# Patient Record
Sex: MALE | Race: WHITE | Employment: FULL TIME | URBAN - METROPOLITAN AREA
[De-identification: names, ages, dates, MRNs, and addresses within clinical notes are randomized per-mention and may not be internally consistent; named-entity substitution may affect disease eponyms.]

---

## 2019-02-12 ENCOUNTER — OFFICE VISIT (OUTPATIENT)
Dept: FAMILY MEDICINE CLINIC | Facility: CLINIC | Age: 49
End: 2019-02-12
Payer: COMMERCIAL

## 2019-02-12 VITALS
BODY MASS INDEX: 27.7 KG/M2 | HEIGHT: 69 IN | DIASTOLIC BLOOD PRESSURE: 80 MMHG | WEIGHT: 187 LBS | TEMPERATURE: 98 F | SYSTOLIC BLOOD PRESSURE: 132 MMHG | HEART RATE: 76 BPM

## 2019-02-12 DIAGNOSIS — R20.2 FACIAL PARESTHESIA: ICD-10-CM

## 2019-02-12 DIAGNOSIS — H92.02 LEFT EAR PAIN: Primary | ICD-10-CM

## 2019-02-12 PROCEDURE — 99203 OFFICE O/P NEW LOW 30 MIN: CPT | Performed by: FAMILY MEDICINE

## 2019-02-12 RX ORDER — VALACYCLOVIR HYDROCHLORIDE 1 G/1
1 TABLET, FILM COATED ORAL 3 TIMES DAILY
Qty: 21 TABLET | Refills: 0 | Status: SHIPPED | OUTPATIENT
Start: 2019-02-12 | End: 2019-02-19

## 2019-02-12 RX ORDER — ASPIRIN 325 MG
325 TABLET ORAL 3 TIMES DAILY PRN
COMMUNITY

## 2019-02-12 NOTE — PATIENT INSTRUCTIONS
--rest, fluids, soups, humidifier, tea with lemon or honey, tylenol/advil as needed for discomfort   --ibuprofen 600mg-800mg 3x/day with food consistently for 2-3 days, then only as needed for pain (do not use for prolonged period)    -- start valacyclov

## 2019-02-12 NOTE — PROGRESS NOTES
CC:  Jolene Ayala is a 51year old male here for Patient presents with:  Ear Pain: Left earache pain x 1 week  Sore Throat: Patient denies fever, bodyache and chills. Sore throat started yesterday.       HPI:     URI  -started 3-4 days ago  -associated with given length of symptoms, exam unremarkable  - encouraged supportive care (rest, fluids, honey, humidifier, tylenol/ibuprofen prn)  - work/school note given  - given paresthesias in face, some concern for early shingles vs bells palsy vs other (parotid sto

## 2019-02-20 ENCOUNTER — OFFICE VISIT (OUTPATIENT)
Dept: FAMILY MEDICINE CLINIC | Facility: CLINIC | Age: 49
End: 2019-02-20
Payer: COMMERCIAL

## 2019-02-20 VITALS
SYSTOLIC BLOOD PRESSURE: 110 MMHG | DIASTOLIC BLOOD PRESSURE: 70 MMHG | BODY MASS INDEX: 27.86 KG/M2 | HEART RATE: 84 BPM | WEIGHT: 188.13 LBS | HEIGHT: 69 IN | TEMPERATURE: 98 F

## 2019-02-20 DIAGNOSIS — R20.2 FACIAL PARESTHESIA: Primary | ICD-10-CM

## 2019-02-20 DIAGNOSIS — Z00.00 ROUTINE GENERAL MEDICAL EXAMINATION AT A HEALTH CARE FACILITY: ICD-10-CM

## 2019-02-20 DIAGNOSIS — R44.8 PARESTHESIA OF TONGUE: ICD-10-CM

## 2019-02-20 PROCEDURE — 99214 OFFICE O/P EST MOD 30 MIN: CPT | Performed by: FAMILY MEDICINE

## 2019-02-20 NOTE — PATIENT INSTRUCTIONS
-- come in for fasting bloodwork anytime that you are able to (8-10h no food, only water; lab here is open M-F, 8am-12)  -- we will call with results about 5-7 days after bloodwork is completed    -- I will touch base with neurologist and let you know if

## 2019-02-21 ENCOUNTER — LAB ENCOUNTER (OUTPATIENT)
Dept: LAB | Age: 49
End: 2019-02-21
Attending: FAMILY MEDICINE
Payer: COMMERCIAL

## 2019-02-21 DIAGNOSIS — Z00.00 ROUTINE GENERAL MEDICAL EXAMINATION AT A HEALTH CARE FACILITY: ICD-10-CM

## 2019-02-21 DIAGNOSIS — R20.2 FACIAL PARESTHESIA: ICD-10-CM

## 2019-02-21 LAB
ALBUMIN SERPL-MCNC: 3.9 G/DL (ref 3.4–5)
ALBUMIN/GLOB SERPL: 1.2 {RATIO} (ref 1–2)
ALP LIVER SERPL-CCNC: 65 U/L (ref 45–117)
ALT SERPL-CCNC: 30 U/L (ref 16–61)
ANION GAP SERPL CALC-SCNC: 7 MMOL/L (ref 0–18)
AST SERPL-CCNC: 18 U/L (ref 15–37)
BASOPHILS # BLD AUTO: 0.05 X10(3) UL (ref 0–0.2)
BASOPHILS NFR BLD AUTO: 0.7 %
BILIRUB SERPL-MCNC: 0.8 MG/DL (ref 0.1–2)
BUN BLD-MCNC: 16 MG/DL (ref 7–18)
BUN/CREAT SERPL: 13.1 (ref 10–20)
CALCIUM BLD-MCNC: 8.7 MG/DL (ref 8.5–10.1)
CHLORIDE SERPL-SCNC: 106 MMOL/L (ref 98–107)
CHOLEST SMN-MCNC: 202 MG/DL (ref ?–200)
CO2 SERPL-SCNC: 27 MMOL/L (ref 21–32)
CREAT BLD-MCNC: 1.22 MG/DL (ref 0.7–1.3)
DEPRECATED RDW RBC AUTO: 40.9 FL (ref 35.1–46.3)
EOSINOPHIL # BLD AUTO: 0.28 X10(3) UL (ref 0–0.7)
EOSINOPHIL NFR BLD AUTO: 3.8 %
ERYTHROCYTE [DISTWIDTH] IN BLOOD BY AUTOMATED COUNT: 13.8 % (ref 11–15)
FOLATE SERPL-MCNC: 26 NG/ML (ref 8.7–?)
GLOBULIN PLAS-MCNC: 3.3 G/DL (ref 2.8–4.4)
GLUCOSE BLD-MCNC: 90 MG/DL (ref 70–99)
HCT VFR BLD AUTO: 48.4 % (ref 39–53)
HDLC SERPL-MCNC: 29 MG/DL (ref 40–59)
HGB BLD-MCNC: 15.2 G/DL (ref 13–17.5)
IMM GRANULOCYTES # BLD AUTO: 0.05 X10(3) UL (ref 0–1)
IMM GRANULOCYTES NFR BLD: 0.7 %
LDLC SERPL CALC-MCNC: 108 MG/DL (ref ?–100)
LYMPHOCYTES # BLD AUTO: 2.01 X10(3) UL (ref 1–4)
LYMPHOCYTES NFR BLD AUTO: 27.6 %
M PROTEIN MFR SERPL ELPH: 7.2 G/DL (ref 6.4–8.2)
MCH RBC QN AUTO: 25.9 PG (ref 26–34)
MCHC RBC AUTO-ENTMCNC: 31.4 G/DL (ref 31–37)
MCV RBC AUTO: 82.6 FL (ref 80–100)
MONOCYTES # BLD AUTO: 0.84 X10(3) UL (ref 0.1–1)
MONOCYTES NFR BLD AUTO: 11.5 %
NEUTROPHILS # BLD AUTO: 4.06 X10 (3) UL (ref 1.5–7.7)
NEUTROPHILS # BLD AUTO: 4.06 X10(3) UL (ref 1.5–7.7)
NEUTROPHILS NFR BLD AUTO: 55.7 %
NONHDLC SERPL-MCNC: 173 MG/DL (ref ?–130)
OSMOLALITY SERPL CALC.SUM OF ELEC: 291 MOSM/KG (ref 275–295)
PLATELET # BLD AUTO: 304 10(3)UL (ref 150–450)
POTASSIUM SERPL-SCNC: 3.9 MMOL/L (ref 3.5–5.1)
RBC # BLD AUTO: 5.86 X10(6)UL (ref 4.3–5.7)
SODIUM SERPL-SCNC: 140 MMOL/L (ref 136–145)
T PALLIDUM AB SER QL IA: NONREACTIVE
TRIGL SERPL-MCNC: 325 MG/DL (ref 30–149)
TSI SER-ACNC: 1.59 MIU/ML (ref 0.36–3.74)
VIT B12 SERPL-MCNC: 691 PG/ML (ref 193–986)
VLDLC SERPL CALC-MCNC: 65 MG/DL (ref 0–30)
WBC # BLD AUTO: 7.3 X10(3) UL (ref 4–11)

## 2019-02-21 PROCEDURE — 82746 ASSAY OF FOLIC ACID SERUM: CPT | Performed by: FAMILY MEDICINE

## 2019-02-21 PROCEDURE — 86780 TREPONEMA PALLIDUM: CPT | Performed by: FAMILY MEDICINE

## 2019-02-21 PROCEDURE — 80061 LIPID PANEL: CPT | Performed by: FAMILY MEDICINE

## 2019-02-21 PROCEDURE — 80050 GENERAL HEALTH PANEL: CPT | Performed by: FAMILY MEDICINE

## 2019-02-21 PROCEDURE — 36415 COLL VENOUS BLD VENIPUNCTURE: CPT | Performed by: FAMILY MEDICINE

## 2019-02-21 PROCEDURE — 82607 VITAMIN B-12: CPT | Performed by: FAMILY MEDICINE

## 2019-02-21 PROCEDURE — 87476 LYME DIS DNA AMP PROBE: CPT | Performed by: FAMILY MEDICINE

## 2019-02-21 NOTE — PROGRESS NOTES
Pacheco Collado is a 51year old male here for Patient presents with: Follow - Up: Today here for facial paresthesia and Left ear pain follow up      HPI:       1.  Facial paresthesia  -ear pain has resolved  -feels like he still has the mild pain/discomfort/ x 3  HEENT: NCAT, pupils equal and round  Pulm: CTAB, no wheezing  CV: RRR, no murmurs  Abd: soft, ND, NT, +BS  Ext: full ROM  Psych: normal affect  Neuro: CN II-XII intact except for pain to light touch in left side of face, b/l UE and LE strength and sen

## 2019-02-27 LAB — BORRELIA SPECIES DNA DETECTION: NOT DETECTED

## 2019-03-04 ENCOUNTER — TELEPHONE (OUTPATIENT)
Dept: FAMILY MEDICINE CLINIC | Facility: CLINIC | Age: 49
End: 2019-03-04

## 2019-03-04 NOTE — TELEPHONE ENCOUNTER
----- Message from Teja Currie MD sent at 3/3/2019 10:37 PM CST -----  cholesterol mildly elevated, increase exercise, reduce fats and sugars, more fruits and veggies   Rest of labs normal, including lyme    Make sure he schedules MRI for further evalu

## 2019-03-04 NOTE — TELEPHONE ENCOUNTER
Patient informed of lab results, he verbalized understanding. Patient has not had MRI scheduled bc he is in the process of changing insurance, he will schedule appointment for MRI as soon as he has insurance.    He will call to schedule 3 month follow up f

## 2022-05-16 ENCOUNTER — TELEPHONE (OUTPATIENT)
Dept: FAMILY MEDICINE CLINIC | Facility: CLINIC | Age: 52
End: 2022-05-16

## 2022-05-16 ENCOUNTER — HOSPITAL ENCOUNTER (OUTPATIENT)
Dept: GENERAL RADIOLOGY | Age: 52
Discharge: HOME | End: 2022-05-16
Attending: FAMILY MEDICINE
Payer: COMMERCIAL

## 2022-05-16 ENCOUNTER — OFFICE VISIT (OUTPATIENT)
Dept: FAMILY MEDICINE CLINIC | Facility: CLINIC | Age: 52
End: 2022-05-16
Payer: COMMERCIAL

## 2022-05-16 VITALS
BODY MASS INDEX: 23.62 KG/M2 | WEIGHT: 165 LBS | OXYGEN SATURATION: 99 % | RESPIRATION RATE: 18 BRPM | SYSTOLIC BLOOD PRESSURE: 120 MMHG | HEART RATE: 78 BPM | TEMPERATURE: 98 F | DIASTOLIC BLOOD PRESSURE: 78 MMHG | HEIGHT: 70 IN

## 2022-05-16 DIAGNOSIS — Z11.59 ENCOUNTER FOR HEPATITIS C SCREENING TEST FOR LOW RISK PATIENT: ICD-10-CM

## 2022-05-16 DIAGNOSIS — Z12.11 SCREENING FOR COLON CANCER: ICD-10-CM

## 2022-05-16 DIAGNOSIS — Z13.0 SCREENING, ANEMIA, DEFICIENCY, IRON: ICD-10-CM

## 2022-05-16 DIAGNOSIS — M54.31 RIGHT SIDED SCIATICA: ICD-10-CM

## 2022-05-16 DIAGNOSIS — Z13.1 SCREENING FOR DIABETES MELLITUS: ICD-10-CM

## 2022-05-16 DIAGNOSIS — Z13.29 SCREENING FOR THYROID DISORDER: ICD-10-CM

## 2022-05-16 DIAGNOSIS — M25.511 ACUTE PAIN OF RIGHT SHOULDER: Primary | ICD-10-CM

## 2022-05-16 DIAGNOSIS — Z12.5 SCREENING FOR MALIGNANT NEOPLASM OF PROSTATE: ICD-10-CM

## 2022-05-16 DIAGNOSIS — Z13.220 SCREENING CHOLESTEROL LEVEL: ICD-10-CM

## 2022-05-16 DIAGNOSIS — Z82.61 FAMILY HISTORY OF RHEUMATOID ARTHRITIS: ICD-10-CM

## 2022-05-16 DIAGNOSIS — M25.511 ACUTE PAIN OF RIGHT SHOULDER: ICD-10-CM

## 2022-05-16 PROCEDURE — 96127 BRIEF EMOTIONAL/BEHAV ASSMT: CPT | Performed by: FAMILY MEDICINE

## 2022-05-16 PROCEDURE — 73030 X-RAY EXAM OF SHOULDER: CPT | Performed by: FAMILY MEDICINE

## 2022-05-16 PROCEDURE — 3008F BODY MASS INDEX DOCD: CPT | Performed by: FAMILY MEDICINE

## 2022-05-16 PROCEDURE — 3074F SYST BP LT 130 MM HG: CPT | Performed by: FAMILY MEDICINE

## 2022-05-16 PROCEDURE — 3078F DIAST BP <80 MM HG: CPT | Performed by: FAMILY MEDICINE

## 2022-05-16 RX ORDER — PREDNISONE 20 MG/1
TABLET ORAL
Qty: 10 TABLET | Refills: 0 | Status: SHIPPED | OUTPATIENT
Start: 2022-05-16

## 2022-05-16 NOTE — TELEPHONE ENCOUNTER
Dr Clearnce Soulier asked to have patient reached out to about depression screening done in office. Advised patient that it was not addressed in visit. Asked patient if he had a counselor - patient states no. Asked patient if he would like us to reach out to [facility] for him - he said no. Patient was OK with waiting to upcoming appointment 6/13/22 to discuss in further detail. Patient agrees to plan. Advised if anything changes to reach out to our office.

## (undated) NOTE — LETTER
Date: 2/12/2019    Patient Name: Angel Barahona          To Whom it may concern: The above patient was seen at the [facility] for treatment of a medical condition.     This patient should be excused from attending work/school from 02/11/2019